# Patient Record
Sex: FEMALE | Race: WHITE
[De-identification: names, ages, dates, MRNs, and addresses within clinical notes are randomized per-mention and may not be internally consistent; named-entity substitution may affect disease eponyms.]

---

## 2017-10-14 ENCOUNTER — HOSPITAL ENCOUNTER (EMERGENCY)
Dept: HOSPITAL 17 - PHED | Age: 72
Discharge: HOME | End: 2017-10-14
Payer: MEDICARE

## 2017-10-14 VITALS
TEMPERATURE: 99 F | OXYGEN SATURATION: 96 % | DIASTOLIC BLOOD PRESSURE: 66 MMHG | HEART RATE: 70 BPM | SYSTOLIC BLOOD PRESSURE: 146 MMHG | RESPIRATION RATE: 16 BRPM

## 2017-10-14 VITALS — HEIGHT: 60 IN | WEIGHT: 125.66 LBS | BODY MASS INDEX: 24.67 KG/M2
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
## 2017-10-14 DIAGNOSIS — T16.2XXA: Primary | ICD-10-CM

## 2017-10-14 PROCEDURE — 69200 CLEAR OUTER EAR CANAL: CPT

## 2017-10-14 NOTE — PD
HPI


Chief Complaint:  ENT Complaint


Time Seen by Provider:  09:45


Travel History


International Travel<30 days:  No


Contact w/Intl Traveler<30days:  No


Traveled to known affect area:  No





History of Present Illness


HPI


Patient with small piece of hearing aid in her left ear.  No pain.  No 

complaints.  Unable to remove it at home.





History


Past Medical History


Medical History:  Denies Significant Hx





Social History


Alcohol Use:  No


Tobacco Use:  No





Allergies-Medications


(Allergen,Severity, Reaction):  


Coded Allergies:  


     No Known Allergies (Verified  Allergy, Unknown, 1/18/08)


Reported Meds & Prescriptions





Reported Meds & Active Scripts


Active


Reported


Percocet (Oxycodone/Acetaminophen) 5 Mg/325 Mg Tab 1 Tab PO Q4HPRN 


     FOR PAIN


Norvasc (Amlodipine Besylate) 10 Mg Tab 10 Mg PO DAILY 


[Herceptin]   0 IV Q3WKS 


Caltrate 600 (Calcium Carbonate)  Tab 1 Tab PO DAILY 


Mevacor (Lovastatin) 40 Mg Tab 40 Mg PO DAILY 


Synthroid (Levothyroxine Sodium) 75 Mcg Tab 75 Mcg PO DAILY 


Aromasin (Exemestane) 25 Mg Tab 25 Mg PO DAILY 








Review of Systems


General / Constitutional:  No: Fever, Chills





Physical Exam


Narrative


Gen.: Well-appearing no acute distress


HEENT: Small plastic foreign body in the left ear





Data


Data


Last Documented VS





Vital Signs








  Date Time  Temp Pulse Resp B/P (MAP) Pulse Ox O2 Delivery O2 Flow Rate FiO2


 


10/14/17 09:42 99.0 70 16 146/66 (92) 96 Room Air  











MDM


Medical Decision Making


Medical Screen Exam Complete:  Yes


Emergency Medical Condition:  Yes


Differential Diagnosis


Foreign body left ear, impaction, laceration, other


Narrative Course


Foreign body was removed from the left ear under direct visualization with 

otoscope with tweezers.





Procedures


**Procedure Narrative**


Foreign body removal:


Under direct visualization with an otoscope, para tweezers, foreign body was 

removed without difficulty.  No damage or trauma to the ear canal.





Diagnosis





 Primary Impression:  


 Foreign body in ear





***Additional Instructions:  


Follow-up with your primary doctor for any new or worsening symptoms.


***Med/Other Pt SpecificInfo:  No Change to Meds


Disposition:  01 DISCHARGE HOME


Condition:  Stable











Ruben Joyce MD Oct 14, 2017 09:51

## 2017-11-08 ENCOUNTER — HOSPITAL ENCOUNTER (EMERGENCY)
Dept: HOSPITAL 17 - PHED | Age: 72
Discharge: HOME | End: 2017-11-08
Payer: MEDICARE

## 2017-11-08 VITALS — BODY MASS INDEX: 24.67 KG/M2 | WEIGHT: 125.66 LBS | HEIGHT: 60 IN

## 2017-11-08 VITALS
OXYGEN SATURATION: 96 % | TEMPERATURE: 97.7 F | RESPIRATION RATE: 16 BRPM | HEART RATE: 72 BPM | DIASTOLIC BLOOD PRESSURE: 60 MMHG | SYSTOLIC BLOOD PRESSURE: 121 MMHG
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
## 2017-11-08 DIAGNOSIS — I10: ICD-10-CM

## 2017-11-08 DIAGNOSIS — S00.83XA: Primary | ICD-10-CM

## 2017-11-08 PROCEDURE — 70450 CT HEAD/BRAIN W/O DYE: CPT

## 2017-11-08 PROCEDURE — 99284 EMERGENCY DEPT VISIT MOD MDM: CPT

## 2017-11-08 PROCEDURE — 70486 CT MAXILLOFACIAL W/O DYE: CPT

## 2017-11-08 NOTE — PD
HPI


Chief Complaint:  Fall


Time Seen by Provider:  09:41


Travel History


International Travel<30 days:  No


Contact w/Intl Traveler<30days:  No


Traveled to known affect area:  No





History of Present Illness


HPI


72-year-old female patient with history of previous breast cancer status post 

treatment, presents to the ER today because she states that she has had right 

temporal headache since last night when she tripped and fell on the steps of a 

movie theater hitting her right temporal area.  She states that the headache is 

currently a 9 out of 10.  She denies any nausea, vomiting, or any other 

injuries.





Modifying Factors: None


Associated Signs & Symptoms: Right sided headaches, fall, head injury


Risk Factors: None





PFSH


Past Medical History


Hx Anticoagulant Therapy:  No


Cancer:  Yes (BREAST)


High Cholesterol:  Yes


Hypertension:  Yes


Thyroid Disease:  Yes





Past Surgical History


Gynecologic Surgery:  Yes (LUMPECTOMY)





Social History


Alcohol Use:  Yes (OCC)


Tobacco Use:  No


Substance Use:  No





Allergies-Medications


(Allergen,Severity, Reaction):  


Coded Allergies:  


     No Known Allergies (Verified  Adverse Reaction, Unknown, 11/8/17)


Reported Meds & Prescriptions





Reported Meds & Active Scripts


Active


Reported


Ambien (Zolpidem Tartrate) 5 Mg Tab 5 Mg PO HS PRN


Lovastatin 40 Mg Tab 40 Mg PO DAILY


Amlodipine (Amlodipine Besylate) 5 Mg Tab 5 Mg PO DAILY


Synthroid (Levothyroxine Sodium) 75 Mcg Tab 75 Mcg PO DAILY


Exemestane 25 Mg Tab   








Review of Systems


Except as stated in HPI:  all other systems reviewed are Neg





Physical Exam


Narrative


GENERAL: [Well-developed pleasant elderly white female patient currently in 

mild distress.  Awake and oriented 3.


SKIN: Focused skin assessment warm/dry.


HEAD: Tender palpation of the right temporal region with no deformities 

identified. Normocephalic. 


EYES: Pupils equal and round. No scleral icterus. No injection or drainage. 


ENT: No nasal bleeding or discharge.  Mucous membranes pink and moist.


NECK: Trachea midline. No JVD.  Supple.  No midline C-spine tenderness.


CARDIOVASCULAR: Regular rate and rhythm.  No murmur appreciated.


RESPIRATORY: No accessory muscle use. Clear to auscultation. Breath sounds 

equal bilaterally. 


GASTROINTESTINAL: Abdomen soft, non-tender, nondistended. Hepatic and splenic 

margins not palpable. 


MUSCULOSKELETAL: No obvious deformities. No clubbing.  No cyanosis.  No edema. 


NEUROLOGICAL: Awake and alert. No obvious cranial nerve deficits.  Motor 

grossly within normal limits. Normal speech.


PSYCHIATRIC: Appropriate mood and affect; insight and judgment normal.





Data


Data


Last Documented VS





Vital Signs








  Date Time  Temp Pulse Resp B/P (MAP) Pulse Ox O2 Delivery O2 Flow Rate FiO2


 


11/8/17 09:21 97.7 72 16 121/60 (80) 96   








Orders





 Orders


Ct Brain W/O Iv Contrast(Rout) (11/8/17 09:37)


Ct Facial Bones W/O Iv Cont (11/8/17 09:41)


Ed Discharge Order (11/8/17 10:35)








MDM


Medical Decision Making


Medical Screen Exam Complete:  Yes


Emergency Medical Condition:  Yes


Medical Record Reviewed:  Yes


Interpretation(s)





Last 24 hours Impressions








Head CT 11/8/17 0937 Signed





Impressions: 





 Service Date/Time:  Wednesday, November 8, 2017 09:47 - CONCLUSION:  1. No 

acute 





 intracranial abnormality.     Kev Barnett MD 








Differential Diagnosis


Fall, head injury, right temporal tenderness: Skull fractures versus acute 

intracranial bleed versus contusions


Narrative Course


CT of the brain and facial bones did not show any signs of acute fractures or 

acute intracranial injuries.  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 

years and does not recommend removal.  This is seen on CAT scan but I do not 

think that this is an acute issue.  Plan would be to release her with follow-up 

to primary care doctor.  Return for worsening in symptoms as needed.  The plan 

has discussed with her and she states understanding.





Diagnosis





 Primary Impression:  


 Facial contusion


 Additional Impression:  


 Minor head injury without loss of consciousness


Disposition:  01 DISCHARGE HOME


Condition:  Stable











Krystyna Segovia MD Nov 8, 2017 10:02

## 2018-05-04 ENCOUNTER — HOSPITAL ENCOUNTER (OUTPATIENT)
Dept: HOSPITAL 17 - HCAV | Age: 73
End: 2018-05-04
Attending: INTERNAL MEDICINE
Payer: MEDICARE

## 2018-05-04 DIAGNOSIS — R94.31: ICD-10-CM

## 2018-05-04 DIAGNOSIS — C50.912: Primary | ICD-10-CM

## 2018-05-04 PROCEDURE — 93005 ELECTROCARDIOGRAM TRACING: CPT

## 2018-05-04 NOTE — EKG
Date Performed: 05/04/2018       Time Performed: 10:44:18

 

PTAGE:      73 years

 

EKG:      Sinus rhythm 

 

. Septal T wave changes are nonspecific Since the previous tracing, no significant change noted Beto alanis ECG

 

PREVIOUS TRACING       : 01/08/2008 09.29

 

DOCTOR:   Dory Stallings  Interpretating Date/Time  05/04/2018 16:47:47

## 2019-09-17 NOTE — RADRPT
EXAM DATE/TIME:  11/08/2017 09:47 

 

[facility] COMPARISON:     

No previous studies available for comparison.

 

 

INDICATIONS :     

Trauma.  Fell and hit head yesterday, loss of consciousness.

                      

 

RADIATION DOSE:     

34.81 CTDIvol (mGy) 

 

 

MEDICAL HISTORY :     

None  

 

SURGICAL HISTORY :      

None. 

 

ENCOUNTER:      

Initial

 

ACUITY:      

1 day

 

PAIN SCORE:      

7/10

 

LOCATION:       

Right facial 

 

TECHNIQUE:     

Volumetric scanning of the facial bones was performed.  Using automated exposure control and adjustme
nt of the mA and/or kV according to patient size, radiation dose was kept as low as reasonably achiev
able to obtain optimal diagnostic quality images.  DICOM format image data is available electronicall
y for review and comparison.  

 

FINDINGS:     

The facial bones are intact. There is no evidence of fracture the visualized orbits or maxilla. The n
rainer bone is intact. The mandible and temporomandibular joints are intact.

 

The soft tissue elements are benign and unremarkable in appearance.

 

There is a fairly large bony outgrowth involving the medial body of the right mandible protruding int
o the right anterior floor of mouth region. This has the appearance of an osteochondroma. The maximum
 width of the dome of the mass is about 13 mm.

 

CONCLUSION:     

No evidence of acute bony injury in the facial structures.

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 This lesion should be fo
llowed clinically for signs of concerning enlargement as might occur with rare malignant degeneration


 

 

 

 Kalen Menezes MD on November 08, 2017 at 10:17           

Board Certified Radiologist.

 This report was verified electronically.
EXAM DATE/TIME:  11/08/2017 09:47 

 

[facility] COMPARISON:     

No previous studies available for comparison.

 

 

INDICATIONS :     

Trauma.  Fell and hit head yesterday, loss of consciousness.

                      

 

RADIATION DOSE:     

56.17 CTDIvol (mGy) 

 

 

 

MEDICAL HISTORY :     

None  

 

SURGICAL HISTORY :      

None. 

 

ENCOUNTER:      

Initial

 

ACUITY:      

1 day

 

PAIN SCALE:      

9/10

 

LOCATION:        

cranial 

 

TECHNIQUE:     

Multiple contiguous axial images were obtained of the head.  Using automated exposure control and adj
ustment of the mA and/or kV according to patient size, radiation dose was kept as low as reasonably a
chievable to obtain optimal diagnostic quality images.   DICOM format image data is available electro
nically for review and comparison.  

 

FINDINGS:     

 

CEREBRUM:     

The ventricles are normal for age.  No evidence of midline shift, mass lesion, hemorrhage or acute in
farction.  No extra-axial fluid collections are seen.

 

POSTERIOR FOSSA:     

The cerebellum and brainstem are intact.  The 4th ventricle is midline.  The cerebellopontine angle i
s unremarkable.

 

EXTRACRANIAL:     

The visualized portion of the orbits is intact.

 

SKULL:     

The calvaria is intact.  No evidence of skull fracture.

 

CONCLUSION:     

1. No acute intracranial abnormality.

 

 

 

 Kev Barnett MD on November 08, 2017 at 10:13           

Board Certified Radiologist.

 This report was verified electronically.
no